# Patient Record
Sex: FEMALE | Race: WHITE
[De-identification: names, ages, dates, MRNs, and addresses within clinical notes are randomized per-mention and may not be internally consistent; named-entity substitution may affect disease eponyms.]

---

## 2019-03-25 LAB
ALBUMIN SERPL-MCNC: 3.5 G/DL (ref 3.5–5)
ALBUMIN/GLOB SERPL: 0.9 {RATIO} (ref 0.8–2)
ALP SERPL-CCNC: 88 IU/L (ref 40–150)
ALT SERPL-CCNC: 25 IU/L (ref 0–55)
ANION GAP SERPL CALC-SCNC: 12.4 MMOL/L (ref 8–16)
BASOPHILS # BLD AUTO: 0 10*3/UL (ref 0–0.1)
BASOPHILS NFR BLD AUTO: 0.4 % (ref 0–1)
BILIRUB UR QL: NEGATIVE
BUN SERPL-MCNC: 23 MG/DL (ref 7–26)
BUN/CREAT SERPL: 22 (ref 6–25)
CALCIUM SERPL-MCNC: 9.4 MG/DL (ref 8.4–10.2)
CHLORIDE SERPL-SCNC: 104 MMOL/L (ref 98–107)
CLARITY UR: (no result)
CO2 SERPL-SCNC: 28 MMOL/L (ref 22–29)
COLOR UR: YELLOW
DEPRECATED NEUTROPHILS # BLD AUTO: 4.9 10*3/UL (ref 2.1–6.9)
EGFRCR SERPLBLD CKD-EPI 2021: 54 ML/MIN (ref 60–?)
EOSINOPHIL # BLD AUTO: 0.2 10*3/UL (ref 0–0.4)
EOSINOPHIL NFR BLD AUTO: 2.6 % (ref 0–6)
ERYTHROCYTE [DISTWIDTH] IN CORD BLOOD: 16.9 % (ref 11.7–14.4)
GLOBULIN PLAS-MCNC: 3.8 G/DL (ref 2.3–3.5)
GLUCOSE SERPLBLD-MCNC: 148 MG/DL (ref 74–118)
HCT VFR BLD AUTO: 38.3 % (ref 34.2–44.1)
HGB BLD-MCNC: 11.2 G/DL (ref 12–16)
KETONES UR QL STRIP.AUTO: NEGATIVE
LEUKOCYTE ESTERASE UR QL STRIP.AUTO: (no result)
LYMPHOCYTES # BLD: 1.7 10*3/UL (ref 1–3.2)
LYMPHOCYTES NFR BLD AUTO: 23 % (ref 18–39.1)
MCH RBC QN AUTO: 25.1 PG (ref 28–32)
MCHC RBC AUTO-ENTMCNC: 29.2 G/DL (ref 31–35)
MCV RBC AUTO: 85.7 FL (ref 81–99)
MONOCYTES # BLD AUTO: 0.6 10*3/UL (ref 0.2–0.8)
MONOCYTES NFR BLD AUTO: 7.8 % (ref 4.4–11.3)
NEUTS SEG NFR BLD AUTO: 65.9 % (ref 38.7–80)
NITRITE UR QL STRIP.AUTO: POSITIVE
PLATELET # BLD AUTO: 238 X10E3/UL (ref 140–360)
POTASSIUM SERPL-SCNC: 4.4 MMOL/L (ref 3.5–5.1)
PROT UR QL STRIP.AUTO: (no result)
RBC # BLD AUTO: 4.47 X10E6/UL (ref 3.6–5.1)
SODIUM SERPL-SCNC: 140 MMOL/L (ref 136–145)
SP GR UR STRIP: 1.02 (ref 1.01–1.02)
UROBILINOGEN UR STRIP-MCNC: 0.2 MG/DL (ref 0.2–1)

## 2019-03-25 NOTE — DIAGNOSTIC IMAGING REPORT
EXAMINATION: PA and lateral views of the chest.



COMPARISON: None



CLINICAL HISTORY:  Preoperative study for bladder surgery

     

DISCUSSION:



The lungs are well-inflated. Right hemidiaphragmatic elevation. No

consolidation, pleural effusion, or pneumothorax. Atherosclerotic calcification

of the aortic arch. Normal heart size without pulmonary edema.



No acute osseous abnormalities. Healed fracture deformity of the anterolateral

right sixth rib.



IMPRESSION: 

No acute cardiopulmonary abnormalities.











Signed by: Dr. Ilya Leone M.D. on 3/25/2019 1:30 PM

## 2019-03-28 ENCOUNTER — HOSPITAL ENCOUNTER (OUTPATIENT)
Dept: HOSPITAL 88 - OR | Age: 54
Discharge: HOME | End: 2019-03-28
Attending: UROLOGY
Payer: COMMERCIAL

## 2019-03-28 VITALS — DIASTOLIC BLOOD PRESSURE: 70 MMHG | SYSTOLIC BLOOD PRESSURE: 130 MMHG

## 2019-03-28 DIAGNOSIS — N30.20: Primary | ICD-10-CM

## 2019-03-28 DIAGNOSIS — I10: ICD-10-CM

## 2019-03-28 DIAGNOSIS — E11.9: ICD-10-CM

## 2019-03-28 DIAGNOSIS — Z88.0: ICD-10-CM

## 2019-03-28 DIAGNOSIS — E78.00: ICD-10-CM

## 2019-03-28 DIAGNOSIS — N32.3: ICD-10-CM

## 2019-03-28 DIAGNOSIS — Z01.810: ICD-10-CM

## 2019-03-28 DIAGNOSIS — R39.12: ICD-10-CM

## 2019-03-28 DIAGNOSIS — N32.89: ICD-10-CM

## 2019-03-28 DIAGNOSIS — Z86.73: ICD-10-CM

## 2019-03-28 DIAGNOSIS — Z01.818: ICD-10-CM

## 2019-03-28 DIAGNOSIS — Z01.812: ICD-10-CM

## 2019-03-28 DIAGNOSIS — R35.1: ICD-10-CM

## 2019-03-28 DIAGNOSIS — Z79.82: ICD-10-CM

## 2019-03-28 DIAGNOSIS — N32.81: ICD-10-CM

## 2019-03-28 PROCEDURE — 81003 URINALYSIS AUTO W/O SCOPE: CPT

## 2019-03-28 PROCEDURE — 85025 COMPLETE CBC W/AUTO DIFF WBC: CPT

## 2019-03-28 PROCEDURE — 71046 X-RAY EXAM CHEST 2 VIEWS: CPT

## 2019-03-28 PROCEDURE — 74420 UROGRAPHY RTRGR +-KUB: CPT

## 2019-03-28 PROCEDURE — 93005 ELECTROCARDIOGRAM TRACING: CPT

## 2019-03-28 PROCEDURE — 82948 REAGENT STRIP/BLOOD GLUCOSE: CPT

## 2019-03-28 PROCEDURE — 36415 COLL VENOUS BLD VENIPUNCTURE: CPT

## 2019-03-28 PROCEDURE — 88313 SPECIAL STAINS GROUP 2: CPT

## 2019-03-28 PROCEDURE — 80053 COMPREHEN METABOLIC PANEL: CPT

## 2019-03-28 PROCEDURE — 84702 CHORIONIC GONADOTROPIN TEST: CPT

## 2019-03-28 PROCEDURE — 52234 CYSTOSCOPY AND TREATMENT: CPT

## 2019-03-28 PROCEDURE — 88305 TISSUE EXAM BY PATHOLOGIST: CPT

## 2019-03-28 NOTE — DIAGNOSTIC IMAGING REPORT
Exam: Bilateral retrograde ureterograms dated 3/28/2019 at 9:03 AM.



History: Bladder lesion



Comparison: None available



Findings: Total of 9 fluoroscopic spot images were obtained and stored to the

medical record. There is retrograde catheterization of both ureters with

injection of contrast material. No intraluminal filling defects are noted.

There is no evidence of hydronephrosis.



Fluoroscopy time: 14 seconds

Total dose: 3.95 mGy





Impression:



1. Retrograde ureterogram as described above.

2. Please see the full report provided by the performing physician.



Signed by: Dr. Yann Sutton DO on 3/28/2019 11:52 AM

## 2019-03-28 NOTE — XMS REPORT
Patient Summary Document

                             Created on: 2019



MESERET CORREA

External Reference #: [id number]

: 1965

Sex: Female



Demographics







                          Address                   [place] 

[place] [address]

 

                          Home Phone                [phone]

 

                          Preferred Language        Unknown

 

                          Marital Status            Unknown

 

                          Holiness Affiliation     Unknown

 

                          Race                      Unknown

 

                                        Additional Race(s)  

 

                          Ethnic Group              Unknown





Author







                          Author                    [facility]

 

                          Address                   Unknown

 

                          Phone                     Unavailable







Care Team Providers







                    Care Team Member Name    Role                Phone

 

                    SERA MONTES       Unavailable         Unavailable







Problems

This patient has no known problems.



Allergies, Adverse Reactions, Alerts

This patient has no known allergies or adverse reactions.



Medications

This patient has no known medications.



Results







           Test Description    Test Time    Test Comments    Text Results    Atomic Results    Result

 Comments

 

                CHEST 2 VIEWS    2019 13:29:00                                                             

                                             [place] [address]  
   Patient Name: MESERET CORREA                                   MR #: 
[id number]                     : 1965                                  
Age/Sex: 53/F  Acct #: [id number]                              Req #: [id number]
[id number]  [facility] Physician:                                                      
Ordered by: SERA MONTES MD                            Report #: [id number]      
 Location: OR                                      Room/Bed:                    
 
___________________________________________________________________________________________________
   Procedure: [id number] DX/CHEST 2 VIEWS  Exam Date: 19                   
        Exam Time: 1300                                              REPORT 
STATUS: Signed       EXAMINATION: PA and lateral views of the chest.      BRAYDON
RISON: None      CLINICAL HISTORY:  Preoperative study for bladder surgery      
    DISCUSSION:      The lungs are well-inflated. Right hemidiaphragmatic 
elevation. No   consolidation, pleural effusion, or pneumothorax. 
Atherosclerotic calcification   of the aortic arch. Normal heart size without 
pulmonary edema.      No acute osseous abnormalities. Healed fracture deformity 
of the anterolateral   right sixth rib.      IMPRESSION:    No acute 
cardiopulmonary abnormalities.                  Signed by: Dr. Madhuri Veliz M.D. on 3/25/2019 1:30 PM        Dictated By: MADHURI VELIZ MD  Electronically S
igned By: MADHURI VELIZ MD on 19 1331  Transcribed By: COLTON on 19
133       COPY TO:   SERA MONTES MD

## 2019-03-29 NOTE — OPERATIVE REPORT
DATE OF PROCEDURE:  03/28/2019

 

SURGEON:  Chas Bernardo MD

 

PREOPERATIVE DIAGNOSIS:  Bladder lesions, overactive bladder.

 

POSTOPERATIVE DIAGNOSIS:  Bladder lesions, overactive bladder.

 

OPERATION PERFORMED:  Cystoscopy, bilateral retrograde pyelograms, bladder

hydrodistention, and bladder biopsies with fulguration. 

 

ANESTHESIOLOGIST:   __________.

 

ANESTHESIA:  General.

 

HISTORY:  This is a 53-year-old female comes to the office with frequency, urgency,

suprapubic pain, and discomfort.  In the office, urodynamic evaluation revealed a small

capacity bladder, evaluation to which cystoscopy revealed trabeculated bladder with one

diverticulum and patchy small lesions in the bladder.  This lesions were nonraised

velvety red, compatible with either carcinoma in situ or interstitial cystitis.

Therefore, the patient and I discussed biopsy. 

 

CYSTOSCOPIC FINDINGS:  The urethra was tight.  Vaginal examination is normal.  Normal

cervix.  The bladder showed normal trigone.  Ureteral orifices were normal.  The

retrograde pyelograms were also normal.  This will be further reviewed by the

radiologist, but no stones or lesions were identified.  The patchy infiltrates were

seen, these were biopsied after bladder hydrodistention reveal a capacity of 325 to 350

mL.  No glomerulation was seen, but after hydrodistention, the patchy infiltrates was

seen as being wider and more raised. 

 

PROCEDURE IN DETAIL:  With the patient under satisfactory general anesthesia, the

patient was placed in the supine position on the operating table.  Genitalia was prepped

with Betadine soap and solution and draped in usual manner.  A time-out was obtained.

All agreed with the procedure as planned.  At this point, the cystoscope was introduced

in the bladder and using a #8 cone-tipped ureteral catheter, contrast media was injected

retrograde up to both kidneys.  Bladder hydrodistention was done x2, 325 and 350 mL were

obtained as residual.  The areas of the bladder that were beefy red.  They were biopsied

and sent in for biopsy to rule out carcinoma in situ and mast cell count.  The Bugbee

electrode was used to fulgurate the biopsy sites.  At this point, the bladder was

emptied.  The cystoscope was then placed in the vagina.  Examination of the vagina

revealed a completely normal cervix.  No other lesions were noted in the vagina itself.

After that, B and O suppository was placed in the rectum and examination of the rectum

revealed no masses and normal perineum.  At this point, the patient was taken to

recovery room in satisfactory condition. 

 

DISCHARGE INSTRUCTIONS:  The patient was given Keflex to take one tablet b.i.d.  I will

see her again in approximately two weeks.  I told the  that I will call them with

the results of the biopsy in three days.  This biopsy is __________ for a second

opinion, will take between 2 to 3 weeks and I also discussed that with the .  She

is to continue all her 

medications except aspirin and diet as tolerated as well as ambulation.  If there is a

problem to call me. 

 

 

 

 

______________________________

MD MINH Conte/MODL

D:  03/28/2019 17:42:32

T:  03/29/2019 00:01:45

Job #:  [id number]